# Patient Record
Sex: FEMALE | Race: WHITE | NOT HISPANIC OR LATINO | Employment: FULL TIME | URBAN - METROPOLITAN AREA
[De-identification: names, ages, dates, MRNs, and addresses within clinical notes are randomized per-mention and may not be internally consistent; named-entity substitution may affect disease eponyms.]

---

## 2017-06-22 ENCOUNTER — HOSPITAL ENCOUNTER (EMERGENCY)
Facility: HOSPITAL | Age: 57
Discharge: HOME | End: 2017-06-22
Attending: EMERGENCY MEDICINE | Admitting: EMERGENCY MEDICINE

## 2017-06-22 VITALS
TEMPERATURE: 97.9 F | RESPIRATION RATE: 16 BRPM | WEIGHT: 175 LBS | BODY MASS INDEX: 32.2 KG/M2 | DIASTOLIC BLOOD PRESSURE: 98 MMHG | HEIGHT: 62 IN | HEART RATE: 63 BPM | SYSTOLIC BLOOD PRESSURE: 184 MMHG | OXYGEN SATURATION: 95 %
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
## 2017-06-22 DIAGNOSIS — S40.261A: Primary | ICD-10-CM

## 2017-06-22 PROCEDURE — 99283 EMERGENCY DEPT VISIT LOW MDM: CPT

## 2017-06-22 RX ORDER — SULFAMETHOXAZOLE AND TRIMETHOPRIM 800; 160 MG/1; MG/1
1 TABLET ORAL 2 TIMES DAILY
Qty: 14 TABLET | Refills: 0 | Status: SHIPPED | OUTPATIENT
Start: 2017-06-22

## 2017-06-22 RX ORDER — LISINOPRIL 20 MG/1
20 TABLET ORAL DAILY
COMMUNITY
Start: 2017-06-02

## 2017-06-22 RX ORDER — LIDOCAINE HYDROCHLORIDE 10 MG/ML
10 INJECTION, SOLUTION INFILTRATION; PERINEURAL ONCE
Status: COMPLETED | OUTPATIENT
Start: 2017-06-22 | End: 2017-06-22

## 2017-06-22 RX ORDER — IBUPROFEN 800 MG/1
800 TABLET ORAL EVERY 6 HOURS PRN
COMMUNITY
Start: 2016-05-16

## 2017-06-22 RX ADMIN — LIDOCAINE HYDROCHLORIDE 10 ML: 10 INJECTION, SOLUTION INFILTRATION; PERINEURAL at 20:58

## 2017-06-22 NOTE — ED PROVIDER NOTES
"EMERGENCY DEPARTMENT ENCOUNTER    CHIEF COMPLAINT  Chief Complaint: insect bite  History given by:patient  History limited by: nothing  Room Number: HALB/B  PMD: Óscar Cooper MD      HPI:  Pt is a 56 y.o. female who presents with an insect bite to her right anterior shoulder for the last four days. Pt states that she is unsure what bit her shoulder. Pt also complains of diaphoresis but denies fever, chills or drainage from the wound.    Duration: 4 days  Timing: constant  Location: right anterior shoulder  Radiation: none  Quality: \"insect bite\"  Intensity/Severity: moderate  Progression: worsening  Associated Symptoms: diaphoresis  Aggravating Factors: none  Alleviating Factors: none   Previous Episodes: Pt denies similar symptoms previously.  Treatment before arrival:none    PAST MEDICAL HISTORY  Active Ambulatory Problems     Diagnosis Date Noted   • No Active Ambulatory Problems     Resolved Ambulatory Problems     Diagnosis Date Noted   • No Resolved Ambulatory Problems     Past Medical History:   Diagnosis Date   • Hypertension        PAST SURGICAL HISTORY  Past Surgical History:   Procedure Laterality Date   • HYSTERECTOMY         FAMILY HISTORY  History reviewed. No pertinent family history.    SOCIAL HISTORY  Social History     Social History   • Marital status:      Spouse name: N/A   • Number of children: N/A   • Years of education: N/A     Occupational History   • Not on file.     Social History Main Topics   • Smoking status: Never Smoker   • Smokeless tobacco: Not on file   • Alcohol use No   • Drug use: No   • Sexual activity: Not on file     Other Topics Concern   • Not on file     Social History Narrative   • No narrative on file         ALLERGIES  Review of patient's allergies indicates no known allergies.    REVIEW OF SYSTEMS  Review of Systems   Constitutional: Positive for diaphoresis. Negative for activity change, appetite change ( decreased), chills, fatigue and fever.   HENT: " Negative.  Negative for congestion, ear pain, rhinorrhea and sore throat.    Eyes: Negative.    Respiratory: Negative.  Negative for cough and shortness of breath.    Cardiovascular: Negative.  Negative for chest pain, palpitations and leg swelling ( pedal).   Gastrointestinal: Negative.  Negative for abdominal pain, constipation, diarrhea, nausea and vomiting.   Endocrine: Negative.    Genitourinary: Negative.  Negative for decreased urine volume, difficulty urinating, dysuria, menstrual problem, pelvic pain, urgency and vaginal discharge.   Musculoskeletal: Negative.  Negative for back pain.   Skin: Positive for wound (insect bite to right anterior shoulder). Negative for rash.   Allergic/Immunologic: Negative.    Neurological: Negative.  Negative for dizziness, weakness, light-headedness, numbness and headaches.   Hematological: Negative.    Psychiatric/Behavioral: Negative.  The patient is not nervous/anxious.    All other systems reviewed and are negative.      PHYSICAL EXAM  ED Triage Vitals   Temp Heart Rate Resp BP SpO2   06/22/17 1821 06/22/17 1821 06/22/17 1859 06/22/17 1859 06/22/17 1821   96.4 °F (35.8 °C) 72 16 199/106 97 %      Temp src Heart Rate Source Patient Position BP Location FiO2 (%)   -- -- 06/22/17 1859 -- --     Sitting         Physical Exam   Constitutional: She is well-developed, well-nourished, and in no distress. No distress.   HENT:   Head: Normocephalic.   Eyes: Pupils are equal, round, and reactive to light.   Musculoskeletal:        Arms:  Skin: Skin is warm and dry.   Psychiatric: Mood, memory, affect and judgment normal.   Nursing note and vitals reviewed.        PROCEDURES  INCISION AND DRAIAGE  Date: 2050  Verbal consent obtained  Risks, benefits, and alternatives discussed  Pt states understanding of procedure and gives consent  Patient identity confirmed by arm band  Type: Abscess  Location:right anterior shoulder  Anesthesia: local 1% lidocaine w/o epi (1cc)  Risk Factors:  "infection, bleeding  Scalpel Size: 11  Incision Type: Single Straight  Complexity: Simple  Drainage amount: none  Wound Treatment: Left Open  Packing Material: none  Pt tolerated procedure well with no immediate complications.     PROGRESS AND CONSULTS    2030- Discussed the plan to discharge the pt home on abx after I+D of the pt's wound at pt's request. RTER warning given for fever, drainage, worsening erythema, lymphangitis or additional concerns Pt and family agree with the plan and all questions were addressed.    2059- Reviewed pt's history and workup with Dr. Cheung.  After a bedside evaluation; Dr. Cheung agrees with the plan of care    COURSE & MEDICAL DECISION MAKING  Pt also made aware that follow up with PMD is necessary.     MEDICATIONS GIVEN IN ER  Medications   lidocaine (XYLOCAINE) 1 % injection 10 mL (10 mL Injection Given by Other 6/22/17 2058)       BP (!) 184/98 (BP Location: Right arm, Patient Position: Sitting)  Pulse 63  Temp 97.9 °F (36.6 °C) (Oral)   Resp 16  Ht 62\" (157.5 cm)  Wt 175 lb (79.4 kg)  SpO2 95%  BMI 32.01 kg/m2    Discussed all results and noted any abnormalities with patient.  Discussed absoute need to recheck abnormalities with PMD    Reviewed implications of results, diagnosis, meds, responsibility to follow up, warning signs and symptoms of possible worsening, potential complications and reasons to return to ER with patient    Discussed plan for discharge, as there is no emergent indication for admission.  Pt is agreeable and understands need for follow up and repeat testing.  Pt is aware that discharge does not mean that nothing is wrong but it indicates no emergency is present and they must continue care with PMD.  Pt is discharged with instructions to follow up with primary care doctor to have their blood pressure rechecked.       DIAGNOSIS  Final diagnoses:   Insect bite of shoulder, right, initial encounter       FOLLOW UP   MD Shalini Ayoub DR " [address]  [facility] [address]  [phone]    Call  As needed, If symptoms worsen      RX     Medication List      New Prescriptions          sulfamethoxazole-trimethoprim 800-160 MG per tablet   Commonly known as:  BACTRIM DS,SEPTRA DS   Take 1 tablet by mouth 2 (Two) Times a Day.           I personally reviewed the past medical history, past surgical history, social history, family history, current medications and allergies as they appear in this chart.  The scribe's note accurately reflects the work and decisions made by me.     I personally scribed for Lottie Murcia on 6/22/2017 at 9:29 PM.  Electronically signed by Mine Franco on 6/22/2017 at time 9:07 PM     Mine Franco  06/22/17 2102       Lottie Murcia, APRN  06/22/17 5320

## 2017-06-23 NOTE — DISCHARGE INSTRUCTIONS
Pt instructions:  Rest  Follow up with Primary Care Doctor for further management and to have your blood pressure rechecked.   Return to ER with streaking, drainage, pain, swelling, numbness/tingling, fever, chills, weakness, nausea, vomiting, diarrhea, abdominal pain, back pain, urinary concerns, chest pain, shortness of breath, dizziness, headache, worsening of symptoms or other concerns.

## 2017-06-23 NOTE — ED PROVIDER NOTES
I supervised care provided by the midlevel provider.  We have discussed this patient's history, physical exam, and treatment plan.  I have reviewed the note and personally saw and examined the patient and agree with the plan of care.    Pt presents c/o insect bite to R upper shoulder which she noticed three days ago. Pt states insect bite site is getting larger in size. Pt does not known what bit her. Pt also c/o diaphoresis. She denies fever. On exam, small indurated area on R shoulder, no lymphangitis or drainage, heart and lungs are normal. I&D performed by Lottie Murcia with minimal drainage. Pt will be d/c home with rx for Bactrim DS and is to f/u with PMD.     Documentation assistance provided by tristan Jay for Dr. Cheung. Information recorded by the scribe was done at my direction and has been verified and validated by me.     Ty Jay  06/22/17 2132       Ryan Cheung MD  06/22/17 2131